# Patient Record
Sex: MALE | Race: WHITE
[De-identification: names, ages, dates, MRNs, and addresses within clinical notes are randomized per-mention and may not be internally consistent; named-entity substitution may affect disease eponyms.]

---

## 2017-03-20 ENCOUNTER — HOSPITAL ENCOUNTER (INPATIENT)
Age: 82
LOS: 1 days | Discharge: HOME | DRG: 554 | End: 2017-03-21
Payer: MEDICARE

## 2017-03-20 DIAGNOSIS — Z53.09: ICD-10-CM

## 2017-03-20 DIAGNOSIS — J45.909: ICD-10-CM

## 2017-03-20 DIAGNOSIS — R73.02: ICD-10-CM

## 2017-03-20 DIAGNOSIS — M21.751: ICD-10-CM

## 2017-03-20 DIAGNOSIS — E78.5: ICD-10-CM

## 2017-03-20 DIAGNOSIS — I10: ICD-10-CM

## 2017-03-20 DIAGNOSIS — M16.11: Primary | ICD-10-CM

## 2017-03-20 DIAGNOSIS — Z79.82: ICD-10-CM

## 2017-03-20 DIAGNOSIS — N39.0: ICD-10-CM

## 2017-03-20 DIAGNOSIS — Z79.84: ICD-10-CM

## 2017-03-20 PROCEDURE — 0T9B70Z DRAINAGE OF BLADDER WITH DRAINAGE DEVICE, VIA NATURAL OR ARTIFICIAL OPENING: ICD-10-PCS | Performed by: ORTHOPAEDIC SURGERY

## 2017-04-10 ENCOUNTER — HOSPITAL ENCOUNTER (OUTPATIENT)
Age: 82
Discharge: HOME | End: 2017-04-10
Payer: MEDICARE

## 2017-04-10 DIAGNOSIS — N39.0: Primary | ICD-10-CM

## 2017-04-10 DIAGNOSIS — M21.751: ICD-10-CM

## 2017-04-10 DIAGNOSIS — M16.11: ICD-10-CM

## 2017-04-17 ENCOUNTER — HOSPITAL ENCOUNTER (INPATIENT)
Age: 82
LOS: 4 days | Discharge: NURSING, REHAB OR LONG-TERM CARE | DRG: 470 | End: 2017-04-21
Payer: MEDICARE

## 2017-04-17 DIAGNOSIS — M16.11: Primary | ICD-10-CM

## 2017-04-17 DIAGNOSIS — J43.9: ICD-10-CM

## 2017-04-17 DIAGNOSIS — I10: ICD-10-CM

## 2017-04-17 DIAGNOSIS — Z87.440: ICD-10-CM

## 2017-04-17 DIAGNOSIS — M21.751: ICD-10-CM

## 2017-04-17 DIAGNOSIS — D62: ICD-10-CM

## 2017-04-17 DIAGNOSIS — E11.9: ICD-10-CM

## 2017-04-17 PROCEDURE — 0SR902Z REPLACEMENT OF RIGHT HIP JOINT WITH METAL ON POLYETHYLENE SYNTHETIC SUBSTITUTE, OPEN APPROACH: ICD-10-PCS | Performed by: ORTHOPAEDIC SURGERY

## 2017-04-20 PROCEDURE — 30233N1 TRANSFUSION OF NONAUTOLOGOUS RED BLOOD CELLS INTO PERIPHERAL VEIN, PERCUTANEOUS APPROACH: ICD-10-PCS | Performed by: ORTHOPAEDIC SURGERY

## 2017-05-01 ENCOUNTER — HOSPITAL ENCOUNTER (EMERGENCY)
Age: 82
Discharge: HOME | End: 2017-05-01
Payer: MEDICARE

## 2017-05-01 DIAGNOSIS — E11.9: ICD-10-CM

## 2017-05-01 DIAGNOSIS — Z79.82: ICD-10-CM
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
## 2017-05-01 DIAGNOSIS — S72.114A: Primary | ICD-10-CM

## 2017-05-01 DIAGNOSIS — I10: ICD-10-CM

## 2017-05-01 DIAGNOSIS — Z96.641: ICD-10-CM

## 2017-05-01 DIAGNOSIS — Z79.84: ICD-10-CM

## 2017-05-05 ENCOUNTER — HOSPITAL ENCOUNTER (OUTPATIENT)
Age: 82
Discharge: HOME | End: 2017-05-05
Payer: COMMERCIAL

## 2017-05-05 DIAGNOSIS — D64.9: Primary | ICD-10-CM

## 2018-01-04 ENCOUNTER — HOSPITAL ENCOUNTER (OUTPATIENT)
Dept: HOSPITAL 76 - DI | Age: 83
Discharge: HOME | End: 2018-01-04
Attending: UROLOGY
Payer: MEDICARE

## 2018-01-04 DIAGNOSIS — I71.4: ICD-10-CM

## 2018-01-04 DIAGNOSIS — R31.0: Primary | ICD-10-CM

## 2018-01-04 LAB
BUN SERPL-MCNC: 23 MG/DL (ref 6–20)
CREAT SERPLBLD-SCNC: 0.5 MG/DL (ref 0.6–1.2)
GFRSERPLBLD MDRD-ARVRAT: 160 ML/MIN/{1.73_M2} (ref 89–?)

## 2018-01-04 PROCEDURE — 74178 CT ABD&PLV WO CNTR FLWD CNTR: CPT

## 2018-01-04 PROCEDURE — 36415 COLL VENOUS BLD VENIPUNCTURE: CPT

## 2018-01-04 PROCEDURE — 84520 ASSAY OF UREA NITROGEN: CPT

## 2018-01-04 PROCEDURE — 82565 ASSAY OF CREATININE: CPT

## 2018-01-05 NOTE — CT REPORT
DATE OF SERVICE: 01/04/2018

 

ABDOMEN AND PELVIS CT WITH AND WITHOUT CONTRAST:  01/04/2018

 

COMPARISON STUDY:  Abdomen and pelvis CT 02/07/2009.

 

INDICATION:  Hematuria.

 

TECHNIQUE:  Axial imaging of the abdomen and pelvis was performed with and 
without contrast.  Coronal and 

sagittal reformats.

 

In accordance with CT protocol optimization, one or more of the following dose 
reduction techniques were 

utilized for this exam:  Automated exposure control, adjustment of mA and/or KV 
based on patient size, or use 

of iterative reconstructive technique.

 

FINDINGS:  There is a 5 mm right middle lobe nodule, stable finding dating back 
to 2009.  Lung bases appear 

otherwise unremarkable.

 

There is a stable cyst in the dome of the liver.  A splenic cyst also appears 
stable.

 

The pancreas and adrenal glands are unremarkable.

 

There is a right renal subcentimeter cyst of unlikely significance.  There is 
an exophytic focus within the 

left kidney that measures 1.6 cm and 52 Hounsfield units.  This is nonspecific, 
but could represent a 

hyperdense cyst.  There is a 1.8 cm parapelvic cyst on the left.

 

No definitive filling defects are seen on urographic study.  The bladder 
appears unremarkable.

 

Right hip prosthesis is noted.

 

There is an infrarenal abdominal aortic aneurysm.  It measures 5.2 x 4.5 cm, 
previously 3.2 x 3.2 cm, a 

significant increase in size.

 

No masses or bulky adenopathy are seen about the abdomen or pelvis.

 

No bone lesions are seen in other regards.  Soft tissues are otherwise 
unremarkable.

 

IMPRESSION:

There are no definite CT findings to suggest a cause of hematuria.  Exophytic 
left renal focus is nonspecific,

but may represent a hyperdense cyst.  It may be followed up.

 

Significantly increased size of abdominal aortic aneurysm, now measuring 5.2 x 
4.5 cm.  This warrants at least

followup evaluation.

 

 

DD: 01/04/2018 13:02

TD: 01/04/2018 19:34

Job #: [id number]

PAVAN

## 2021-01-14 ENCOUNTER — HOSPITAL ENCOUNTER (OUTPATIENT)
Dept: HOSPITAL 76 - EMS | Age: 86
End: 2021-01-14
Attending: SURGERY
Payer: MEDICARE

## 2021-01-14 DIAGNOSIS — R55: Primary | ICD-10-CM

## 2021-01-14 DIAGNOSIS — R11.10: ICD-10-CM

## 2021-01-14 DIAGNOSIS — R15.9: ICD-10-CM

## 2022-09-15 ENCOUNTER — HOSPITAL ENCOUNTER (OUTPATIENT)
Dept: HOSPITAL 76 - LAB.R | Age: 87
Discharge: HOME | End: 2022-09-15
Attending: NURSE PRACTITIONER
Payer: MEDICARE

## 2022-09-15 DIAGNOSIS — D64.9: Primary | ICD-10-CM

## 2022-09-15 LAB
BASOPHILS NFR BLD AUTO: 0 10^3/UL (ref 0–0.1)
BASOPHILS NFR BLD AUTO: 0.5 %
EOSINOPHIL # BLD AUTO: 0.1 10^3/UL (ref 0–0.7)
EOSINOPHIL NFR BLD AUTO: 2.4 %
ERYTHROCYTE [DISTWIDTH] IN BLOOD BY AUTOMATED COUNT: 15.2 % (ref 12–15)
HCT VFR BLD AUTO: 26.7 % (ref 42–52)
HGB UR QL STRIP: 8.4 G/DL (ref 14–18)
LYMPHOCYTES # SPEC AUTO: 0.5 10^3/UL (ref 1.5–3.5)
LYMPHOCYTES NFR BLD AUTO: 12.6 %
MCH RBC QN AUTO: 29.8 PG (ref 27–31)
MCHC RBC AUTO-ENTMCNC: 31.5 G/DL (ref 32–36)
MCV RBC AUTO: 94.7 FL (ref 80–94)
MONOCYTES # BLD AUTO: 0.4 10^3/UL (ref 0–1)
MONOCYTES NFR BLD AUTO: 9.4 %
NEUTROPHILS # BLD AUTO: 3.1 10^3/UL (ref 1.5–6.6)
NEUTROPHILS # SNV AUTO: 4.1 X10^3/UL (ref 4.8–10.8)
NEUTROPHILS NFR BLD AUTO: 74.6 %
NRBC # BLD AUTO: 0 /100WBC
NRBC # BLD AUTO: 0 X10^3/UL
PDW BLD AUTO: 10.6 FL (ref 7.4–11.4)
PLATELET # BLD: 127 10^3/UL (ref 130–450)
RBC MAR: 2.82 10^6/UL (ref 4.7–6.1)

## 2022-09-15 PROCEDURE — 85025 COMPLETE CBC W/AUTO DIFF WBC: CPT
